# Patient Record
Sex: FEMALE | Race: WHITE | URBAN - METROPOLITAN AREA
[De-identification: names, ages, dates, MRNs, and addresses within clinical notes are randomized per-mention and may not be internally consistent; named-entity substitution may affect disease eponyms.]

---

## 2019-03-25 ENCOUNTER — OFFICE VISIT (OUTPATIENT)
Dept: INTERNAL MEDICINE CLINIC | Age: 59
End: 2019-03-25

## 2019-03-25 VITALS
TEMPERATURE: 98.4 F | OXYGEN SATURATION: 97 % | BODY MASS INDEX: 21.16 KG/M2 | RESPIRATION RATE: 18 BRPM | SYSTOLIC BLOOD PRESSURE: 92 MMHG | HEART RATE: 68 BPM | DIASTOLIC BLOOD PRESSURE: 67 MMHG | WEIGHT: 127 LBS | HEIGHT: 65 IN

## 2019-03-25 DIAGNOSIS — J01.00 ACUTE NON-RECURRENT MAXILLARY SINUSITIS: Primary | ICD-10-CM

## 2019-03-25 DIAGNOSIS — J20.9 ACUTE BRONCHITIS, UNSPECIFIED ORGANISM: ICD-10-CM

## 2019-03-25 RX ORDER — ESOMEPRAZOLE MAGNESIUM 40 MG/1
40 CAPSULE, DELAYED RELEASE ORAL DAILY
COMMUNITY
Start: 2011-04-04

## 2019-03-25 RX ORDER — ASCORBIC ACID 500 MG
1 TABLET ORAL DAILY
COMMUNITY

## 2019-03-25 RX ORDER — CHOLECALCIFEROL (VITAMIN D3) 125 MCG
1 CAPSULE ORAL DAILY
COMMUNITY

## 2019-03-25 RX ORDER — LANOLIN ALCOHOL/MO/W.PET/CERES
400 CREAM (GRAM) TOPICAL DAILY
COMMUNITY
End: 2019-05-07

## 2019-03-25 RX ORDER — AZITHROMYCIN 250 MG/1
TABLET, FILM COATED ORAL
Qty: 6 TAB | Refills: 0 | Status: SHIPPED | OUTPATIENT
Start: 2019-03-25 | End: 2019-03-30

## 2019-03-25 NOTE — PROGRESS NOTES
Chief Complaint Patient presents with  Cough  
  and congestion  Chills 1. Have you been to the ER, urgent care clinic since your last visit? Hospitalized since your last visit? No 
 
2. Have you seen or consulted any other health care providers outside of the [address] [place] [address] since your last visit? Include any pap smears or colon screening.  No

## 2019-03-25 NOTE — PROGRESS NOTES
HPI:  
 
This [place] lady who was a patient from my old practice came in today for an urgent problem. Her records have not been forwarded. She reports recent onset of sore throat with vocal hoarseness, ear congestion, post nasal drainage, facial pain and barking cough productive or light yellow secretions over the this  weekend after taking her grandson to the park. Her son Inocente Cheek was diagnosed with bronchitis earlier this week. She has used Dayquil with minimal relief. She denies any fevers, chills or myalgias. History reviewed. No pertinent past medical history. Past Surgical History:  
Procedure Laterality Date  HX APPENDECTOMY  HX GI    
 HX ORTHOPAEDIC No current outpatient medications on file. No current facility-administered medications for this visit. Allergies and Intolerances: Allergies Allergen Reactions  Sulfa (Sulfonamide Antibiotics) Hives  Pcn [Penicillins] Hives  Erythromycin Other (comments) Family History:  
Family History Problem Relation Age of Onset  Elevated Lipids Mother  Heart Disease Father Social History: She  reports that she has never smoked. She has never used smokeless tobacco.  
Social History Substance and Sexual Activity Alcohol Use Never  Frequency: Never Immunization History:           Flu vaccine received Physical:  
Visit Vitals BP 92/67 (BP 1 Location: Left arm, BP Patient Position: Sitting) Pulse 68 Temp 98.4 °F (36.9 °C) (Oral) Resp 18 Ht 5' 4.5\" (1.638 m) Wt 127 lb (57.6 kg) SpO2 97% BMI 21.46 kg/m² Physical Exam  
Constitutional:  
Tired appearing lady with vocal hoarseness and intermittent barking cough HENT:  
Right Ear: External ear normal.  
Left Ear: External ear normal.  
Mouth/Throat: Oropharynx is clear and moist. No oropharyngeal exudate. Maxillary sinus tenderness Neck: Neck supple. Pulmonary/Chest: Breath sounds normal. She has no wheezes. She has no rales. Lymphadenopathy:  
  She has no cervical adenopathy. Vitals reviewed. Review of Data: 
Labs: 
No visits with results within 3 Month(s) from this visit. Latest known visit with results is: No results found for any previous visit. Impression/Plan: 
 
Acute sinusitis/bronchitis Rx:  Z-merlyn (tolerated in the past), Cheratussin (left over), Vitamin C, fluids and rest 
 
 
Branden Nathan MD

## 2019-03-25 NOTE — PATIENT INSTRUCTIONS
Sinusitis/Bronchitis>>Zpak, Cheratussin, Vitamin C, fuids, rest 
 
Consider [facility] in the future

## 2019-03-26 ENCOUNTER — TELEPHONE (OUTPATIENT)
Dept: INTERNAL MEDICINE CLINIC | Age: 59
End: 2019-03-26

## 2019-03-26 DIAGNOSIS — J20.9 ACUTE BRONCHITIS, UNSPECIFIED ORGANISM: Primary | ICD-10-CM

## 2019-03-26 RX ORDER — METHYLPREDNISOLONE 4 MG/1
TABLET ORAL
Qty: 1 DOSE PACK | Refills: 0 | Status: SHIPPED | OUTPATIENT
Start: 2019-03-26

## 2019-03-26 NOTE — TELEPHONE ENCOUNTER
Pt's  calling for a prednisone pack for his wife. She is very congested and gets SOB at times,.  He says this has helped her in the past.

## 2019-04-07 PROBLEM — M85.80 OSTEOPENIA: Status: ACTIVE | Noted: 2019-04-07

## 2019-04-07 PROBLEM — J20.9 ACUTE BRONCHITIS: Status: RESOLVED | Noted: 2019-03-25 | Resolved: 2019-04-07

## 2019-04-07 PROBLEM — J01.00 ACUTE NON-RECURRENT MAXILLARY SINUSITIS: Status: RESOLVED | Noted: 2019-03-25 | Resolved: 2019-04-07

## 2019-04-07 PROBLEM — E78.2 MIXED HYPERLIPIDEMIA: Status: ACTIVE | Noted: 2019-04-07

## 2019-04-07 PROBLEM — N80.9 ENDOMETRIOSIS: Status: ACTIVE | Noted: 2019-04-07

## 2019-04-07 PROBLEM — K21.9 GASTROESOPHAGEAL REFLUX DISEASE WITHOUT ESOPHAGITIS: Status: ACTIVE | Noted: 2019-04-07

## 2019-04-07 PROBLEM — J30.1 SEASONAL ALLERGIC RHINITIS DUE TO POLLEN: Status: ACTIVE | Noted: 2019-04-07

## 2019-05-07 ENCOUNTER — OFFICE VISIT (OUTPATIENT)
Dept: INTERNAL MEDICINE CLINIC | Age: 59
End: 2019-05-07

## 2019-05-07 VITALS
WEIGHT: 123 LBS | HEIGHT: 65 IN | TEMPERATURE: 98.3 F | HEART RATE: 81 BPM | OXYGEN SATURATION: 98 % | BODY MASS INDEX: 20.49 KG/M2 | RESPIRATION RATE: 18 BRPM | SYSTOLIC BLOOD PRESSURE: 86 MMHG | DIASTOLIC BLOOD PRESSURE: 62 MMHG

## 2019-05-07 DIAGNOSIS — R05.9 COUGH: Primary | ICD-10-CM

## 2019-05-07 DIAGNOSIS — J40 BRONCHITIS: ICD-10-CM

## 2019-05-07 RX ORDER — HYDROCODONE POLISTIREX AND CHLORPHENIRAMINE POLISTIREX 10; 8 MG/5ML; MG/5ML
5 SUSPENSION, EXTENDED RELEASE ORAL
Qty: 60 ML | Refills: 0 | Status: SHIPPED | OUTPATIENT
Start: 2019-05-07 | End: 2019-05-14

## 2019-05-07 RX ORDER — DOXYCYCLINE 100 MG/1
100 CAPSULE ORAL 2 TIMES DAILY
Qty: 20 CAP | Refills: 0 | Status: SHIPPED | OUTPATIENT
Start: 2019-05-07

## 2019-05-07 NOTE — PATIENT INSTRUCTIONS
Mycoplasma bronchitis (suspected)  DOXYCYCLINE 100mg twice a day x 10 days with food + Tussionex Extended Suspension one tsp twice a day, Elderberry/Honey Lozenge for cough Neck Pain>>Myofascial>>Ibuprofen and moist heat and deep tissue massage

## 2019-05-07 NOTE — PROGRESS NOTES
Chief Complaint Patient presents with  Cough  
  clear to yellow sputum 1. Have you been to the ER, urgent care clinic since your last visit? Hospitalized since your last visit? No 
 
2. Have you seen or consulted any other health care providers outside of the [address] [place] [address] since your last visit? Include any pap smears or colon screening.  No

## 2019-05-07 NOTE — PROGRESS NOTES
HPI:     This is a non-smoker who presents to the office with nearly 6 week history of deep barking cough with expectoration of dark yellow secretions accompanied by pa in along the left side of throat associated with difficulty swallowing. She denies any fever, chills, wheezing or dyspnea. She has completed course of Zpak, Medrol and Robitussin without relief. She was seen at [facility] and given Tessalon Perles without relief. She feels exhausted from this ordeal because of lack of sleep. Past Medical History:   Diagnosis Date    Endometriosis 4/7/2019    Gastroesophageal reflux disease without esophagitis 4/7/2019    Mixed hyperlipidemia 4/7/2019    ACC/AHA risk score and CAC score very low    Osteopenia 4/7/2019     Past Surgical History:   Procedure Laterality Date    HX APPENDECTOMY      HX DILATION AND CURETTAGE      HX ENDOSCOPY      HX OVARIAN CYST REMOVAL      HX PELVIC LAPAROSCOPY       Current Outpatient Medications   Medication Sig    methylPREDNISolone (MEDROL DOSEPACK) 4 mg tablet Take as directed on package  Indications: bronchospasm    esomeprazole (NEXIUM) 40 mg capsule Take 40 mg by mouth daily.  ascorbic acid, vitamin C, (VITAMIN C) 500 mg tablet Take 1 Tab by mouth daily.  cholecalciferol, vitamin D3, 2,000 unit tab Take 1 Tab by mouth daily.  magnesium oxide (MAG-OX) 400 mg tablet Take 400 mg by mouth daily. No current facility-administered medications for this visit. Allergies and Intolerances:    Allergies   Allergen Reactions    Sulfa (Sulfonamide Antibiotics) Hives    Pcn [Penicillins] Hives    Erythromycin Other (comments)     Family History:   Family History   Problem Relation Age of Onset    Elevated Lipids Mother     Arthritis-osteo Mother     Migraines Mother     Heart Disease Father         CABG    No Known Problems Brother     Ovarian Cancer Maternal Grandmother     Cancer Paternal Grandmother         Leukemia     Social History:   She reports that she has never smoked. She has never used smokeless tobacco.   Social History     Substance and Sexual Activity   Alcohol Use Never    Frequency: Never     Immunization History:        Shingrix and Flu vaccine received    Physical:   Visit Vitals  BP (!) 86/62 (BP 1 Location: Left arm, BP Patient Position: Sitting)   Pulse 81   Temp 98.3 °F (36.8 °C) (Oral)   Resp 18   Ht 5' 4.5\" (1.638 m)   Wt 123 lb (55.8 kg)   SpO2 98%   BMI 20.79 kg/m²          Physical Exam   Constitutional: She is well-developed, well-nourished, and in no distress. HENT:   Right Ear: External ear normal.   Left Ear: External ear normal.   Mouth/Throat: Oropharynx is clear and moist.   Injected left TM   Eyes: Conjunctivae are normal. No scleral icterus. Clear oropharynx   Neck: No JVD present. Trigger point tenderness left SCM   Cardiovascular: Normal rate, regular rhythm and normal heart sounds. Pulmonary/Chest: Breath sounds normal. She has no wheezes. Musculoskeletal: She exhibits no edema. Lymphadenopathy:     She has no cervical adenopathy. Review of Data:    CXR  Labs:  No visits with results within 3 Month(s) from this visit. Latest known visit with results is:   No results found for any previous visit.      Impression/Plan:   Patient Active Problem List   Diagnosis  Code    Bronchitis likely from Mycoplasma CXR, Mycoplasma IgG, Hycotuss, Doxycycline, Honey/Elderberry Lozenge E78.2    Neck pain, myofascial type Moist head, Aleve or Advil J30.1         Rigo Garibay MD

## 2019-05-09 LAB
BASOPHILS # BLD AUTO: 0 X10E3/UL (ref 0–0.2)
BASOPHILS NFR BLD AUTO: 0 %
EOSINOPHIL # BLD AUTO: 0.1 X10E3/UL (ref 0–0.4)
EOSINOPHIL NFR BLD AUTO: 1 %
ERYTHROCYTE [DISTWIDTH] IN BLOOD BY AUTOMATED COUNT: 13.8 % (ref 12.3–15.4)
HCT VFR BLD AUTO: 38.7 % (ref 34–46.6)
HGB BLD-MCNC: 12.7 G/DL (ref 11.1–15.9)
IMM GRANULOCYTES # BLD AUTO: 0 X10E3/UL (ref 0–0.1)
IMM GRANULOCYTES NFR BLD AUTO: 0 %
LYMPHOCYTES # BLD AUTO: 4.4 X10E3/UL (ref 0.7–3.1)
LYMPHOCYTES NFR BLD AUTO: 53 %
M PNEUMO IGG SER IA-ACNC: 110 U/ML (ref 0–99)
MCH RBC QN AUTO: 29.1 PG (ref 26.6–33)
MCHC RBC AUTO-ENTMCNC: 32.8 G/DL (ref 31.5–35.7)
MCV RBC AUTO: 89 FL (ref 79–97)
MONOCYTES # BLD AUTO: 0.9 X10E3/UL (ref 0.1–0.9)
MONOCYTES NFR BLD AUTO: 11 %
NEUTROPHILS # BLD AUTO: 2.9 X10E3/UL (ref 1.4–7)
NEUTROPHILS NFR BLD AUTO: 35 %
PLATELET # BLD AUTO: 347 X10E3/UL (ref 150–379)
RBC # BLD AUTO: 4.37 X10E6/UL (ref 3.77–5.28)
WBC # BLD AUTO: 8.3 X10E3/UL (ref 3.4–10.8)

## 2019-05-24 ENCOUNTER — OFFICE VISIT (OUTPATIENT)
Dept: INTERNAL MEDICINE CLINIC | Age: 59
End: 2019-05-24

## 2019-05-24 VITALS
HEART RATE: 65 BPM | TEMPERATURE: 98.2 F | RESPIRATION RATE: 18 BRPM | SYSTOLIC BLOOD PRESSURE: 104 MMHG | HEIGHT: 60 IN | BODY MASS INDEX: 24.35 KG/M2 | DIASTOLIC BLOOD PRESSURE: 68 MMHG | OXYGEN SATURATION: 98 % | WEIGHT: 124 LBS

## 2019-05-24 DIAGNOSIS — J30.1 SEASONAL ALLERGIC RHINITIS DUE TO POLLEN: Primary | ICD-10-CM

## 2019-05-24 RX ORDER — FLUTICASONE PROPIONATE 50 MCG
SPRAY, SUSPENSION (ML) NASAL
Qty: 1 BOTTLE | Refills: 5 | Status: SHIPPED | OUTPATIENT
Start: 2019-05-24

## 2019-05-24 RX ORDER — MONTELUKAST SODIUM 10 MG/1
10 TABLET ORAL DAILY
Qty: 30 TAB | Refills: 1 | Status: SHIPPED | OUTPATIENT
Start: 2019-05-24

## 2019-05-24 NOTE — PROGRESS NOTES
Chief Complaint   Patient presents with    Cough     1. Have you been to the ER, urgent care clinic since your last visit? Hospitalized since your last visit? No    2. Have you seen or consulted any other health care providers outside of the [address] [place] [address] since your last visit? Include any pap smears or colon screening.  No

## 2021-09-13 NOTE — PROGRESS NOTES
HPI:     This lady presents to the office because of persistent barking cough which started back in February. This has been mainly dry and not accompanied by wheezing or dyspnea. She reports sneezing, clear rhinorrhea, post nasal drainage and dry scratch throat. The  reports that her cough is worse at night and was wondering if this was related to GERD. She has history of severe allergies and was on desensitization therapy for a number of years but stopped it after developing syncope following an allergy shot. She is taking Zyrtec but is not on Flonase. [place] with very [place] tress along the property. They do not have any pets. Past Medical History:   Diagnosis Date    Acute bronchitis 3/25/2019    Acute non-recurrent maxillary sinusitis 3/25/2019    Endometriosis 4/7/2019    Gastroesophageal reflux disease without esophagitis 4/7/2019    Mixed hyperlipidemia 4/7/2019    ACC/AHA risk score and CAC score very low    Osteopenia 4/7/2019     Past Surgical History:   Procedure Laterality Date    HX APPENDECTOMY      HX DILATION AND CURETTAGE      HX ENDOSCOPY      HX OVARIAN CYST REMOVAL      HX PELVIC LAPAROSCOPY       Current Outpatient Medications   Medication Sig    esomeprazole (NEXIUM) 40 mg capsule Take 40 mg by mouth daily.  ascorbic acid, vitamin C, (VITAMIN C) 500 mg tablet Take 1 Tab by mouth daily.  cholecalciferol, vitamin D3, 2,000 unit tab Take 1 Tab by mouth daily.  doxycycline (MONODOX) 100 mg capsule Take 1 Cap by mouth two (2) times a day. Indications: mycoplasma bronchitis    methylPREDNISolone (MEDROL DOSEPACK) 4 mg tablet Take as directed on package  Indications: bronchospasm     No current facility-administered medications for this visit. Allergies and Intolerances:    Allergies   Allergen Reactions    Sulfa (Sulfonamide Antibiotics) Hives    Pcn [Penicillins] Hives    Erythromycin Other (comments)     Family History:   Family History   Problem Relation Age of Onset    Elevated Lipids Mother     Arthritis-osteo Mother     Migraines Mother     Heart Disease Father         CABG    No Known Problems Brother     Ovarian Cancer Maternal Grandmother     Cancer Paternal Grandmother         Leukemia     Social History:   She  reports that she has never smoked. She has never used smokeless tobacco.   Social History     Substance and Sexual Activity   Alcohol Use Never    Frequency: Never     Physical:   Visit Vitals  /68 (BP 1 Location: Left arm, BP Patient Position: Sitting)   Pulse 65   Temp 98.2 °F (36.8 °C) (Oral)   Resp 18   Ht 5' (1.524 m)   Wt 124 lb (56.2 kg)   SpO2 98%   BMI 24.22 kg/m²          Physical Exam   Constitutional:   Tired appearing lady with barking cough   HENT:   Right Ear: External ear normal.   Left Ear: External ear normal.   Mouth/Throat: Oropharynx is clear and moist. No oropharyngeal exudate. Tender temporalis muscles   Neck: No thyromegaly present. Taut neck muscles with trigger point tenderness   Cardiovascular: Normal rate, regular rhythm and normal heart sounds. Exam reveals no gallop. No murmur heard. Pulmonary/Chest: Breath sounds normal. She has no wheezes. She has no rales. Lymphadenopathy:     She has no cervical adenopathy. Skin:   No cyanosis or edema   Vitals reviewed.        Review of Data:  Labs:  Office Visit on 05/07/2019   Component Date Value Ref Range Status    WBC 05/07/2019 8.3  3.4 - 10.8 x10E3/uL Final    RBC 05/07/2019 4.37  3.77 - 5.28 x10E6/uL Final    HGB 05/07/2019 12.7  11.1 - 15.9 g/dL Final    HCT 05/07/2019 38.7  34.0 - 46.6 % Final    MCV 05/07/2019 89  79 - 97 fL Final    MCH 05/07/2019 29.1  26.6 - 33.0 pg Final    MCHC 05/07/2019 32.8  31.5 - 35.7 g/dL Final    RDW 05/07/2019 13.8  12.3 - 15.4 % Final    PLATELET 76/92/2872 662  150 - 379 x10E3/uL Final    NEUTROPHILS 05/07/2019 35  Not Estab. % Final    Lymphocytes 05/07/2019 53  Not Estab. % Final    MONOCYTES 05/07/2019 11  Not Estab. % Final    EOSINOPHILS 05/07/2019 1  Not Estab. % Final    BASOPHILS 05/07/2019 0  Not Estab. % Final    ABS. NEUTROPHILS 05/07/2019 2.9  1.4 - 7.0 x10E3/uL Final    Abs Lymphocytes 05/07/2019 4.4* 0.7 - 3.1 x10E3/uL Final    ABS. MONOCYTES 05/07/2019 0.9  0.1 - 0.9 x10E3/uL Final    ABS. EOSINOPHILS 05/07/2019 0.1  0.0 - 0.4 x10E3/uL Final    ABS. BASOPHILS 05/07/2019 0.0  0.0 - 0.2 x10E3/uL Final    IMMATURE GRANULOCYTES 05/07/2019 0  Not Estab. % Final    ABS. IMM. GRANS. 05/07/2019 0.0  0.0 - 0.1 x10E3/uL Final    M. pneumoniae Ab, IgG 05/07/2019 110* 0 - 99 U/mL Final       Impression/Plan:   Patient Active Problem List   Diagnosis  Code    Chronic cough secondary to:  1. Allergic Rhinitis with PND  2. Cough Variant Asthma  3. GERD  4. Viral respiratory infection ENT consultation, arrange IgE and allergen profile Zone 2, Zyrtec, Singulair, Symbicort, Dexilant (samples), Tussionex J45. Silvia Freeman MD 81